# Patient Record
Sex: MALE | URBAN - METROPOLITAN AREA
[De-identification: names, ages, dates, MRNs, and addresses within clinical notes are randomized per-mention and may not be internally consistent; named-entity substitution may affect disease eponyms.]

---

## 2023-11-02 ENCOUNTER — EMERGENCY (EMERGENCY)
Facility: HOSPITAL | Age: 48
LOS: 1 days | Discharge: LEFT BEFORE DISCHARGE | End: 2023-11-02
Attending: EMERGENCY MEDICINE | Admitting: EMERGENCY MEDICINE
Payer: SELF-PAY

## 2023-11-02 VITALS
HEIGHT: 69 IN | WEIGHT: 306 LBS | TEMPERATURE: 98 F | DIASTOLIC BLOOD PRESSURE: 97 MMHG | RESPIRATION RATE: 18 BRPM | OXYGEN SATURATION: 96 % | SYSTOLIC BLOOD PRESSURE: 179 MMHG | HEART RATE: 79 BPM

## 2023-11-02 DIAGNOSIS — M79.89 OTHER SPECIFIED SOFT TISSUE DISORDERS: ICD-10-CM

## 2023-11-02 DIAGNOSIS — Z53.21 PROCEDURE AND TREATMENT NOT CARRIED OUT DUE TO PATIENT LEAVING PRIOR TO BEING SEEN BY HEALTH CARE PROVIDER: ICD-10-CM

## 2023-11-02 PROCEDURE — L9991: CPT

## 2023-11-02 NOTE — ED ADULT TRIAGE NOTE - CHIEF COMPLAINT QUOTE
Pt presents to ED c/o L leg swelling / drainage x 5 days. sent in by PCP to rule out cellulitis vs DVT. denies fever, chills, shortness of breath, chest pain.

## 2023-11-02 NOTE — ED ADULT NURSE NOTE - OBJECTIVE STATEMENT
Pt left without being seen by RN or MD, did not announce leaving or verbalize reason. MD Ree aware, Charge RN Aware.